# Patient Record
Sex: FEMALE | Race: ASIAN | NOT HISPANIC OR LATINO | URBAN - METROPOLITAN AREA
[De-identification: names, ages, dates, MRNs, and addresses within clinical notes are randomized per-mention and may not be internally consistent; named-entity substitution may affect disease eponyms.]

---

## 2017-10-20 ENCOUNTER — EMERGENCY (EMERGENCY)
Facility: HOSPITAL | Age: 27
LOS: 0 days | Discharge: HOME | End: 2017-10-21
Attending: EMERGENCY MEDICINE | Admitting: EMERGENCY MEDICINE
Payer: MEDICAID

## 2017-10-20 VITALS — HEIGHT: 64 IN | WEIGHT: 199.08 LBS

## 2017-10-20 DIAGNOSIS — R10.9 UNSPECIFIED ABDOMINAL PAIN: ICD-10-CM

## 2017-10-20 DIAGNOSIS — R11.0 NAUSEA: ICD-10-CM

## 2017-10-20 RX ORDER — SODIUM CHLORIDE 9 MG/ML
3 INJECTION INTRAMUSCULAR; INTRAVENOUS; SUBCUTANEOUS ONCE
Qty: 0 | Refills: 0 | Status: COMPLETED | OUTPATIENT
Start: 2017-10-20 | End: 2017-10-20

## 2017-10-20 RX ORDER — ONDANSETRON 8 MG/1
4 TABLET, FILM COATED ORAL ONCE
Qty: 0 | Refills: 0 | Status: COMPLETED | OUTPATIENT
Start: 2017-10-20 | End: 2017-10-20

## 2017-10-20 RX ORDER — MORPHINE SULFATE 50 MG/1
2 CAPSULE, EXTENDED RELEASE ORAL ONCE
Qty: 0 | Refills: 0 | Status: DISCONTINUED | OUTPATIENT
Start: 2017-10-20 | End: 2017-10-20

## 2017-10-20 NOTE — ED PROVIDER NOTE - NS_ ATTENDINGSCRIBEDETAILS _ED_A_ED_FT
I Richard Shetty MD saw and examined this patient. Scribe documented for me and under my supervision. I have modified the scribe's documentation where necessary to reflect my history and physical exam findings.

## 2017-10-20 NOTE — ED ADULT NURSE NOTE - OBJECTIVE STATEMENT
pt arrives ambulatory complaining of abdominal pain. pt states she thinks [employer] staff put whole milk in her coffee and developed abdominal pain after drinking the coffee this afternoon. pt has history of abdominal pain after drinking whole milk. pt denies medical history. alert and oriented x 4.

## 2017-10-20 NOTE — ED PROVIDER NOTE - CHPI ED SYMPTOMS NEG
no fever/no diarrhea/no vomiting no diarrhea/no fever/no vomiting/NO CP, SOB, SWELLING No cp, sob, palpitation, or lower extremity swelling/no vomiting/no diarrhea/no fever

## 2017-10-20 NOTE — ED PROVIDER NOTE - OBJECTIVE STATEMENT
28 yo female denies PMH, presents c/o abdominal pain and nausea today.  States she may have a milk allergy and thinks the [employer] staff put whole milk in her coffee today.  No fever, vomiting, diarrhea, CP, SOB.  Has been evaluated for this pain multiple times in the past, no clear cause found.  Took Tylenol and Pepto-Bismol today without relief. 26 yo female denies PMH, presents c/o abdominal pain and nausea today.  States she may have a milk allergy and thinks the [employer] staff put whole milk in her coffee today.  No fever, vomiting, diarrhea, CP, SOB, swelling.  Has been evaluated for this pain multiple times in the past, no clear cause found.  Took Tylenol and Pepto-Bismol today without relief.

## 2017-10-20 NOTE — ED PROVIDER NOTE - PROGRESS NOTE DETAILS
Jj Michaud ([facility]) for Dr. Shetty: Updated patient on her results.  Gave follow-up information and instructed patient to return for worsening symptoms

## 2017-10-20 NOTE — ED PROVIDER NOTE - MEDICAL DECISION MAKING DETAILS
Pt with abd pain.  Plan labs, pain control, CT abd. Diogenes TOBIAS: Patient provided with her labs and imaging results. Outpatient follow up and instructions tor return if any health concerns provided.

## 2017-10-21 VITALS
SYSTOLIC BLOOD PRESSURE: 128 MMHG | TEMPERATURE: 98 F | DIASTOLIC BLOOD PRESSURE: 78 MMHG | OXYGEN SATURATION: 100 % | RESPIRATION RATE: 16 BRPM | HEART RATE: 80 BPM

## 2017-10-21 LAB
ALBUMIN SERPL ELPH-MCNC: 3.8 G/DL — SIGNIFICANT CHANGE UP (ref 3.3–5)
ALP SERPL-CCNC: 49 U/L — SIGNIFICANT CHANGE UP (ref 40–120)
ALT FLD-CCNC: 71 U/L — SIGNIFICANT CHANGE UP (ref 12–78)
ANION GAP SERPL CALC-SCNC: 8 MMOL/L — SIGNIFICANT CHANGE UP (ref 5–17)
APPEARANCE UR: CLEAR — SIGNIFICANT CHANGE UP
APTT BLD: 30.5 SEC — SIGNIFICANT CHANGE UP (ref 27.5–37.4)
AST SERPL-CCNC: 26 U/L — SIGNIFICANT CHANGE UP (ref 15–37)
BACTERIA # UR AUTO: (no result)
BASOPHILS # BLD AUTO: 0.1 K/UL — SIGNIFICANT CHANGE UP (ref 0–0.2)
BASOPHILS NFR BLD AUTO: 1.2 % — SIGNIFICANT CHANGE UP (ref 0–2)
BILIRUB SERPL-MCNC: 0.3 MG/DL — SIGNIFICANT CHANGE UP (ref 0.2–1.2)
BILIRUB UR-MCNC: NEGATIVE — SIGNIFICANT CHANGE UP
BUN SERPL-MCNC: 13 MG/DL — SIGNIFICANT CHANGE UP (ref 7–23)
CALCIUM SERPL-MCNC: 8.7 MG/DL — SIGNIFICANT CHANGE UP (ref 8.5–10.1)
CHLORIDE SERPL-SCNC: 105 MMOL/L — SIGNIFICANT CHANGE UP (ref 96–108)
CO2 SERPL-SCNC: 27 MMOL/L — SIGNIFICANT CHANGE UP (ref 22–31)
COLOR SPEC: YELLOW — SIGNIFICANT CHANGE UP
CREAT SERPL-MCNC: 0.76 MG/DL — SIGNIFICANT CHANGE UP (ref 0.5–1.3)
DIFF PNL FLD: (no result)
EOSINOPHIL # BLD AUTO: 0.1 K/UL — SIGNIFICANT CHANGE UP (ref 0–0.5)
EOSINOPHIL NFR BLD AUTO: 1.9 % — SIGNIFICANT CHANGE UP (ref 0–6)
EPI CELLS # UR: SIGNIFICANT CHANGE UP
GLUCOSE SERPL-MCNC: 107 MG/DL — HIGH (ref 70–99)
GLUCOSE UR QL: NEGATIVE MG/DL — SIGNIFICANT CHANGE UP
HCT VFR BLD CALC: 32.9 % — LOW (ref 34.5–45)
HGB BLD-MCNC: 11.1 G/DL — LOW (ref 11.5–15.5)
INR BLD: 0.97 RATIO — SIGNIFICANT CHANGE UP (ref 0.88–1.16)
KETONES UR-MCNC: NEGATIVE — SIGNIFICANT CHANGE UP
LEUKOCYTE ESTERASE UR-ACNC: (no result)
LIDOCAIN IGE QN: 256 U/L — SIGNIFICANT CHANGE UP (ref 73–393)
LYMPHOCYTES # BLD AUTO: 2.7 K/UL — SIGNIFICANT CHANGE UP (ref 1–3.3)
LYMPHOCYTES # BLD AUTO: 36.4 % — SIGNIFICANT CHANGE UP (ref 13–44)
MCHC RBC-ENTMCNC: 29.1 PG — SIGNIFICANT CHANGE UP (ref 27–34)
MCHC RBC-ENTMCNC: 33.8 GM/DL — SIGNIFICANT CHANGE UP (ref 32–36)
MCV RBC AUTO: 86.2 FL — SIGNIFICANT CHANGE UP (ref 80–100)
MONOCYTES # BLD AUTO: 0.5 K/UL — SIGNIFICANT CHANGE UP (ref 0–0.9)
MONOCYTES NFR BLD AUTO: 7.3 % — SIGNIFICANT CHANGE UP (ref 2–14)
NEUTROPHILS # BLD AUTO: 3.9 K/UL — SIGNIFICANT CHANGE UP (ref 1.8–7.4)
NEUTROPHILS NFR BLD AUTO: 53.2 % — SIGNIFICANT CHANGE UP (ref 43–77)
NITRITE UR-MCNC: NEGATIVE — SIGNIFICANT CHANGE UP
PH UR: 6 — SIGNIFICANT CHANGE UP (ref 5–8)
PLATELET # BLD AUTO: 307 K/UL — SIGNIFICANT CHANGE UP (ref 150–400)
POTASSIUM SERPL-MCNC: 3.7 MMOL/L — SIGNIFICANT CHANGE UP (ref 3.5–5.3)
POTASSIUM SERPL-SCNC: 3.7 MMOL/L — SIGNIFICANT CHANGE UP (ref 3.5–5.3)
PROT SERPL-MCNC: 7.3 GM/DL — SIGNIFICANT CHANGE UP (ref 6–8.3)
PROT UR-MCNC: 15 MG/DL
PROTHROM AB SERPL-ACNC: 10.5 SEC — SIGNIFICANT CHANGE UP (ref 9.8–12.7)
RBC # BLD: 3.82 M/UL — SIGNIFICANT CHANGE UP (ref 3.8–5.2)
RBC # FLD: 11.6 % — SIGNIFICANT CHANGE UP (ref 10.3–14.5)
RBC CASTS # UR COMP ASSIST: (no result) /HPF (ref 0–4)
SODIUM SERPL-SCNC: 140 MMOL/L — SIGNIFICANT CHANGE UP (ref 135–145)
SP GR SPEC: 1.02 — SIGNIFICANT CHANGE UP (ref 1.01–1.02)
UROBILINOGEN FLD QL: 1 MG/DL
WBC # BLD: 7.4 K/UL — SIGNIFICANT CHANGE UP (ref 3.8–10.5)
WBC # FLD AUTO: 7.4 K/UL — SIGNIFICANT CHANGE UP (ref 3.8–10.5)
WBC UR QL: SIGNIFICANT CHANGE UP

## 2017-10-21 PROCEDURE — 99284 EMERGENCY DEPT VISIT MOD MDM: CPT

## 2017-10-21 PROCEDURE — 74177 CT ABD & PELVIS W/CONTRAST: CPT | Mod: 26

## 2017-10-21 RX ADMIN — SODIUM CHLORIDE 3 MILLILITER(S): 9 INJECTION INTRAMUSCULAR; INTRAVENOUS; SUBCUTANEOUS at 00:08

## 2017-10-21 RX ADMIN — MORPHINE SULFATE 2 MILLIGRAM(S): 50 CAPSULE, EXTENDED RELEASE ORAL at 00:08

## 2017-10-21 RX ADMIN — ONDANSETRON 4 MILLIGRAM(S): 8 TABLET, FILM COATED ORAL at 00:08

## 2018-02-01 ENCOUNTER — OUTPATIENT (OUTPATIENT)
Dept: OUTPATIENT SERVICES | Facility: HOSPITAL | Age: 28
LOS: 1 days | End: 2018-02-01
Payer: MEDICAID

## 2018-02-01 PROCEDURE — G9001: CPT

## 2018-02-12 ENCOUNTER — EMERGENCY (EMERGENCY)
Facility: HOSPITAL | Age: 28
LOS: 0 days | Discharge: HOME | End: 2018-02-12
Attending: EMERGENCY MEDICINE | Admitting: EMERGENCY MEDICINE
Payer: MEDICAID

## 2018-02-12 VITALS
DIASTOLIC BLOOD PRESSURE: 86 MMHG | TEMPERATURE: 98 F | WEIGHT: 160.06 LBS | RESPIRATION RATE: 18 BRPM | SYSTOLIC BLOOD PRESSURE: 125 MMHG | HEIGHT: 64 IN | OXYGEN SATURATION: 100 % | HEART RATE: 73 BPM

## 2018-02-12 VITALS
DIASTOLIC BLOOD PRESSURE: 83 MMHG | RESPIRATION RATE: 17 BRPM | HEART RATE: 72 BPM | SYSTOLIC BLOOD PRESSURE: 117 MMHG | OXYGEN SATURATION: 100 %

## 2018-02-12 DIAGNOSIS — H10.9 UNSPECIFIED CONJUNCTIVITIS: ICD-10-CM

## 2018-02-12 DIAGNOSIS — H57.8 OTHER SPECIFIED DISORDERS OF EYE AND ADNEXA: ICD-10-CM

## 2018-02-12 DIAGNOSIS — L29.9 PRURITUS, UNSPECIFIED: ICD-10-CM

## 2018-02-12 PROCEDURE — 99283 EMERGENCY DEPT VISIT LOW MDM: CPT

## 2018-02-12 RX ORDER — OFLOXACIN 0.3 %
2 DROPS OPHTHALMIC (EYE) ONCE
Qty: 0 | Refills: 0 | Status: COMPLETED | OUTPATIENT
Start: 2018-02-12 | End: 2018-02-12

## 2018-02-12 RX ORDER — OFLOXACIN 0.3 %
2 DROPS OPHTHALMIC (EYE)
Qty: 5 | Refills: 0 | OUTPATIENT
Start: 2018-02-12 | End: 2018-02-18

## 2018-02-12 RX ADMIN — Medication 2 DROP(S): at 01:25

## 2018-02-12 NOTE — ED PROVIDER NOTE - OBJECTIVE STATEMENT
28 y/o female in ED c/o left eye redness and itching tonight.  pt denies any pain or change in vision.  pt denies any trauma or bites.  pt with no other complaints.

## 2018-02-12 NOTE — ED ADULT NURSE NOTE - NS ED NURSE DC INFO COMPLEXITY
Returned Demonstration/Patient asked questions/Moderate: Comprehensive teaching/Verbalized Understanding

## 2018-02-12 NOTE — ED ADULT NURSE NOTE - CHPI ED SYMPTOMS NEG
no vomiting/no change in level of consciousness/no chills/no syncope/no nausea/no weakness/no fever/no loss of consciousness/no blurred vision/no numbness

## 2018-02-12 NOTE — ED ADULT NURSE NOTE - OBJECTIVE STATEMENT
27 year old female presenting to the ED complaining of pain, swelling, and redness to her left eye. Starting yesterday and worsening over time. No vision changes or deficits. Upon assessment, patient has redness and swelling to her left eye. No drainage noted. No fevers reported.

## 2018-02-13 DIAGNOSIS — R69 ILLNESS, UNSPECIFIED: ICD-10-CM

## 2019-01-25 ENCOUNTER — EMERGENCY (EMERGENCY)
Facility: HOSPITAL | Age: 29
LOS: 0 days | Discharge: HOME | End: 2019-01-25
Attending: EMERGENCY MEDICINE | Admitting: EMERGENCY MEDICINE
Payer: COMMERCIAL

## 2019-01-25 VITALS
TEMPERATURE: 98 F | SYSTOLIC BLOOD PRESSURE: 129 MMHG | HEART RATE: 70 BPM | OXYGEN SATURATION: 100 % | DIASTOLIC BLOOD PRESSURE: 82 MMHG | RESPIRATION RATE: 16 BRPM

## 2019-01-25 VITALS — WEIGHT: 182.98 LBS | HEIGHT: 64.5 IN

## 2019-01-25 DIAGNOSIS — R11.0 NAUSEA: ICD-10-CM

## 2019-01-25 DIAGNOSIS — R10.9 UNSPECIFIED ABDOMINAL PAIN: ICD-10-CM

## 2019-01-25 DIAGNOSIS — R10.10 UPPER ABDOMINAL PAIN, UNSPECIFIED: ICD-10-CM

## 2019-01-25 PROCEDURE — 99283 EMERGENCY DEPT VISIT LOW MDM: CPT

## 2019-01-25 NOTE — ED STATDOCS - MEDICAL DECISION MAKING DETAILS
pt with chronic abd pain 4 months, results of ct abdoman and u/s abdomen she brought with reviewed and no acute pathology. her exam is nml. she was put on percocet by a pcp . I informed her, and  her mother as well that I do not feel she has any acute abd emergency and needs to f/u with her established surgeon and I cannot provide anything more for pain other than the percocet she is on . they were upset with this and nursing administration was informed to address there complaints

## 2019-01-25 NOTE — ED STATDOCS - OBJECTIVE STATEMENT
27 y/o F with PMHx of presenting to the ED c/o worsening upper abdominal pain. States that she has been experiencing abdominal pain since 10/2018. Pt has had multiple work-ups performed for this issue. Per pt, scans have shown evidence of possible lymphoma. States that over the past few days, abdominal pain has worsened. +Associated nausea. Seen by Surgeon Dr. Julio Miller three days ago for abdominal pain and back pain, given Percocet. Last dose Percocet at 0300 this morning. LMP 01/01. NKDA. 29 y/o F with PMHx of presenting to the ED c/o worsening upper abdominal pain. States that she has been experiencing abdominal pain since 10/2018. Pt has had multiple work-ups performed for this issue. Per pt, scans have shown evidence of possible lymphoma. States that over the past few days, abdominal pain has worsened. +Associated nausea. Seen by Surgeon Dr. Julio Miller three days ago for abdominal pain and back pain, given Percocet. Last dose Percocet at 0300 this morning. LMP 01/01. NKDA. no vaginal bleeding or discharge no urianry f/u/d no sob no ha

## 2019-01-25 NOTE — ED ADULT TRIAGE NOTE - CHIEF COMPLAINT QUOTE
Pt states she has abdominal pain radiating into her chest all weekend that worsened today with nausea and chills. Denies vomiting, states subjective fever.

## 2024-08-12 NOTE — ED STATDOCS - NORMAL, MLM
Quality 110: Preventive Care And Screening: Influenza Immunization: Influenza Immunization not Administered for Documented Reasons. Detail Level: Detailed le all pertinent systems normal

## 2024-12-02 NOTE — ED STATDOCS - NSTIMEPROVIDERCAREINITIATE_GEN_ER
Jacob Arechiga NP notified.  Patient is being direct admitted to [facility] today.     25-Jan-2019 20:53